# Patient Record
Sex: MALE | Race: WHITE | URBAN - METROPOLITAN AREA
[De-identification: names, ages, dates, MRNs, and addresses within clinical notes are randomized per-mention and may not be internally consistent; named-entity substitution may affect disease eponyms.]

---

## 2019-10-19 ENCOUNTER — OFFICE VISIT (OUTPATIENT)
Dept: URGENT CARE | Facility: URGENT CARE | Age: 29
End: 2019-10-19
Payer: COMMERCIAL

## 2019-10-19 VITALS
TEMPERATURE: 98 F | WEIGHT: 196 LBS | RESPIRATION RATE: 16 BRPM | DIASTOLIC BLOOD PRESSURE: 80 MMHG | HEIGHT: 77 IN | SYSTOLIC BLOOD PRESSURE: 130 MMHG | BODY MASS INDEX: 23.14 KG/M2 | HEART RATE: 80 BPM

## 2019-10-19 DIAGNOSIS — S61.411A LACERATION OF RIGHT HAND WITHOUT FOREIGN BODY, INITIAL ENCOUNTER: Primary | ICD-10-CM

## 2019-10-19 PROCEDURE — 12002 RPR S/N/AX/GEN/TRNK2.6-7.5CM: CPT | Performed by: NURSE PRACTITIONER

## 2019-10-19 PROCEDURE — 90715 TDAP VACCINE 7 YRS/> IM: CPT | Performed by: NURSE PRACTITIONER

## 2019-10-19 PROCEDURE — 90471 IMMUNIZATION ADMIN: CPT | Performed by: NURSE PRACTITIONER

## 2019-10-19 PROCEDURE — 99202 OFFICE O/P NEW SF 15 MIN: CPT | Mod: 25 | Performed by: NURSE PRACTITIONER

## 2019-10-19 ASSESSMENT — MIFFLIN-ST. JEOR: SCORE: 1966.43

## 2019-10-19 NOTE — PROGRESS NOTES
"SUBJECTIVE:     Chief Complaint   Patient presents with     Laceration     laceration to rt dorsal hand sustained on metal aprox 1/2 hr ago     Rubén Barbosa is a 29 year old male who presents to the clinic with a laceration on the right hand sustained about 30 minutes ago.  This is a non-work related and accidental injury.    Mechanism of injury: States was remodeling a house and then sustained a laceration by metal to the right hand.    Associated symptoms: Denies numbness, weakness, or loss of function  Last tetanus booster within 10 years: no    EXAM:   The patient appears today in alert,no apparent distress distress  VITALS: /80 (Cuff Size: Adult Regular)   Pulse 80   Temp 98  F (36.7  C) (Oral)   Resp 16   Ht 1.956 m (6' 5\")   Wt 88.9 kg (196 lb)   BMI 23.24 kg/m      Patient has 3 different lacerations that do overlap each other.   Size of laceration: 2.5 cm, 4 cm, and 1 cm  Characteristics of the laceration: dirty, jagged and longitudinal  Tendon function intact: yes  Sensation to light touch intact: yes  Pulses intact: yes    Assessment:  Laceration of right hand without foreign body, initial encounter    PLAN:  PROCEDURE NOTE::  Wound was locally injected with 5 cc's of Lidocaine 1% plain  Wound cleaned with HIBICLENS  Laceration was closed using 12 (total sutures) 4-0 nylon interrupted sutures  ( 2.5 cm lac closed with 3 sutures, 4 cm lac closed with 7 sutures, 1 cm lac closed with 2 sutures)    After care instructions:  Augmentin twice a day for 7 days  Sutures out in 7 days  Signs of infection discussed today  Apply anti-bacterial ointment  Tylenol and ibuprofen to help with pain  Ice to help with pain and swelling  Education was added to AVS. Patient was agreeable to plan and verbalized understanding.       "

## 2019-10-19 NOTE — PATIENT INSTRUCTIONS
augmentin twice a day for 7 days  Tylenol and ibuprofen to help with pain  Ice to help with pain and swelling      Patient Education     Hand Laceration: All Closures  A laceration is a cut through the skin. Deep cuts usually require stitches. Minor cuts may be closed with surgical tape or skin adhesive.   X-rays may be done if something may have entered the skin through the cut, such as broken glass. You may also be given a tetanus shot if you are not up to date on this vaccination and the object that cut you may carry tetanus.    Home care    Your healthcare provider may prescribe an antibiotic. This is to help prevent infection. Follow all instructions for taking this medicine. Take the medicine every day until it is gone or you are told to stop. You should not have any left over.    The healthcare provider may prescribe medicines for pain. Follow instructions for taking them.    Follow the healthcare provider s instructions on how to care for the cut.    Keep the wound clean and dry. Don't get the wound wet until you are told it is OK to do so. If the bandage gets wet, remove it. Gently pat the wound dry with a clean cloth. Then put on a clean, dry bandage.    To help prevent infection, wash your hands with soap and water before and after caring for the wound.     Caring for stiches: Once you no longer need to keep the stitches dry, clean the wound daily. First, remove the bandage. Then wash the area gently with soap and warm water, or as directed by the healthcare provider. Use a wet cotton swab to loosen and remove any blood or crust that forms. After cleaning, apply a thin layer of antibiotic ointment if advised. Then put on a new bandage unless you are told not to.    Caring for skin glue: Don t put apply liquid, ointment, or cream on the wound while the glue is in place. Avoid activities that cause heavy sweating. Protect the wound from sunlight. Don't scratch, rub, or pick at the adhesive film. Don't place  tape directly over the film. The glue should peel off within 5 to 10 days.     Caring for surgical tape: Keep the area dry. If it gets wet, blot it dry with a clean towel. Surgical tape usually falls off within 7 to 10 days. If it has not fallen off after 10 days, you can take it off yourself. Put mineral oil or petroleum jelly on a cotton ball and gently rub the tape until it is removed.    Once you can get the wound wet, you may shower as usual, but don't soak the wound in water. This means no tub baths or swimming.    Even with proper treatment, a wound infection may sometimes occur. Check the wound daily for signs of infection listed below.  Follow-up care  Follow up with your healthcare provider, or as advised. If you have stitches, be sure to return as directed to have them removed.  When to seek medical advice  Call your healthcare provider right away if any of these occur:    Wound bleeding not controlled by direct pressure    Signs of infection, including increasing pain in the wound, increasing wound redness or swelling, or pus or bad odor coming from the wound    Fever of 100.4 F (38. C) o higher, or as directed by your healthcare provider    Stitches come apart or fall out or surgical tape falls off before 7 days    Wound edges reopen    Wound changes colors    Numbness or weakness in the affected hand     Decreased movement of the hand  Date Last Reviewed: 7/1/2017 2000-2018 [employer] [address] [place] [address], [place] [address]. All rights reserved. This information is not intended as a substitute for professional medical care. Always follow your healthcare professional's instructions.

## 2020-03-11 ENCOUNTER — HEALTH MAINTENANCE LETTER (OUTPATIENT)
Age: 30
End: 2020-03-11

## 2021-01-03 ENCOUNTER — HEALTH MAINTENANCE LETTER (OUTPATIENT)
Age: 31
End: 2021-01-03

## 2021-04-25 ENCOUNTER — HEALTH MAINTENANCE LETTER (OUTPATIENT)
Age: 31
End: 2021-04-25

## 2021-10-10 ENCOUNTER — HEALTH MAINTENANCE LETTER (OUTPATIENT)
Age: 31
End: 2021-10-10

## 2022-05-22 ENCOUNTER — HEALTH MAINTENANCE LETTER (OUTPATIENT)
Age: 32
End: 2022-05-22

## 2022-09-18 ENCOUNTER — HEALTH MAINTENANCE LETTER (OUTPATIENT)
Age: 32
End: 2022-09-18

## 2023-06-04 ENCOUNTER — HEALTH MAINTENANCE LETTER (OUTPATIENT)
Age: 33
End: 2023-06-04